# Patient Record
Sex: FEMALE | Race: WHITE | Employment: PART TIME | URBAN - METROPOLITAN AREA
[De-identification: names, ages, dates, MRNs, and addresses within clinical notes are randomized per-mention and may not be internally consistent; named-entity substitution may affect disease eponyms.]

---

## 2018-11-26 ENCOUNTER — HOSPITAL ENCOUNTER (EMERGENCY)
Age: 48
Discharge: HOME | End: 2018-11-26
Attending: EMERGENCY MEDICINE
Payer: COMMERCIAL

## 2018-11-26 VITALS
SYSTOLIC BLOOD PRESSURE: 136 MMHG | DIASTOLIC BLOOD PRESSURE: 82 MMHG | TEMPERATURE: 97.7 F | HEIGHT: 60 IN | OXYGEN SATURATION: 99 % | BODY MASS INDEX: 23.56 KG/M2 | RESPIRATION RATE: 18 BRPM | WEIGHT: 120 LBS | HEART RATE: 87 BPM

## 2018-11-26 DIAGNOSIS — N20.0 KIDNEY STONE: Primary | ICD-10-CM

## 2018-11-26 DIAGNOSIS — R31.29 OTHER MICROSCOPIC HEMATURIA: ICD-10-CM

## 2018-11-26 LAB
BACTERIA: NORMAL /HPF
BILIRUBIN URINE: NEGATIVE
BLOOD, URINE: ABNORMAL
CLARITY: CLEAR
COLOR: YELLOW
EPITHELIAL CELLS, UA: NORMAL /HPF
GLUCOSE URINE: NEGATIVE MG/DL
KETONES, URINE: NEGATIVE MG/DL
LEUKOCYTE ESTERASE, URINE: NEGATIVE
NITRITE, URINE: NEGATIVE
PH UA: 6 (ref 5–9)
PROTEIN UA: NEGATIVE MG/DL
RBC UA: NORMAL /HPF (ref 0–2)
SPECIFIC GRAVITY UA: 1 (ref 1–1.03)
URINE REFLEX TO CULTURE: YES
UROBILINOGEN, URINE: 0.2 E.U./DL
WBC UA: NORMAL /HPF (ref 0–5)

## 2018-11-26 PROCEDURE — 81001 URINALYSIS AUTO W/SCOPE: CPT

## 2018-11-26 PROCEDURE — 99283 EMERGENCY DEPT VISIT LOW MDM: CPT

## 2018-11-26 PROCEDURE — 87086 URINE CULTURE/COLONY COUNT: CPT

## 2018-11-26 ASSESSMENT — PAIN SCALES - GENERAL: PAINLEVEL_OUTOF10: 2

## 2018-11-26 ASSESSMENT — ENCOUNTER SYMPTOMS
PHOTOPHOBIA: 0
NAUSEA: 0
COLOR CHANGE: 0
FACIAL SWELLING: 0
RHINORRHEA: 0
ABDOMINAL PAIN: 0
EYE DISCHARGE: 0
ABDOMINAL DISTENTION: 0
VOMITING: 0
WHEEZING: 0
SHORTNESS OF BREATH: 0

## 2018-11-26 ASSESSMENT — PAIN DESCRIPTION - DESCRIPTORS: DESCRIPTORS: DISCOMFORT

## 2018-11-26 ASSESSMENT — PAIN DESCRIPTION - PAIN TYPE: TYPE: ACUTE PAIN

## 2018-11-26 ASSESSMENT — PAIN DESCRIPTION - ORIENTATION: ORIENTATION: RIGHT

## 2018-11-26 ASSESSMENT — PAIN DESCRIPTION - LOCATION: LOCATION: FLANK

## 2018-11-26 NOTE — ED PROVIDER NOTES
[facility] ED  eMERGENCY dEPARTMENT eNCOUnter      Pt Name: David Rosa  MRN: [id number]  [place] 1970  Date of evaluation: 11/26/2018  Provider: Philip Bales, [address] [place] [address]       Chief Complaint   Patient presents with    Hematuria         HISTORY OF PRESENT ILLNESS   (Location/Symptom, Timing/Onset,Context/Setting, Quality, Duration, Modifying Factors, Severity)  Note limiting factors. David Rosa is a 57 y.o. female who presents to the emergency department With a chief complaint of blood in her urine. Patient states that she noticed a little bit of red tinge to her urine last night and then this morning it was bright red. Upon arrival to the emergency department and given a urine specimen and it has gone away completely. She describes an ache off and on in her right flank that has been mild over the last week. She cannot think of anything that would have caused it and it doesn't bother her to move in certain positions. She is not having it now. She has not been nauseated. She has no history of kidney stones other than found inadvertently on cystoscopy. She denies any lower abdominal pain and is due for her period but not for another week. \"This doesn't seem like my period to me\". No other urine symptoms or fevers or chills. HPI    NursingNotes were reviewed. REVIEW OF SYSTEMS    (2-9 systems for level 4, 10 or more for level 5)     Review of Systems   Constitutional: Negative for activity change and appetite change. HENT: Negative for congestion, facial swelling and rhinorrhea. Eyes: Negative for photophobia and discharge. Respiratory: Negative for shortness of breath and wheezing. Cardiovascular: Negative for chest pain. Gastrointestinal: Negative for abdominal distention, abdominal pain, nausea and vomiting. Endocrine: Negative for polydipsia and polyphagia. Genitourinary: Positive for flank pain and hematuria.  Negative for decreased urine

## 2018-11-26 NOTE — ED TRIAGE NOTES
Patient presents to the ER as a walk in with c/o hematuria. Patient states when she awoke this morning and went to the restroom 'i thought I was urinating straight blood'. Patient states she also believes that she is due for her menstrual cycle but is unsure at this time. Patient also reports mild discomfort to her right flank, no radiating pain. Patient denies any dysuria, or frequency with urination. Patient asked to obtain urine specimen upon walking to bed 7 from triage.

## 2018-11-27 LAB — URINE CULTURE, ROUTINE: NORMAL

## 2019-03-21 ENCOUNTER — HOSPITAL ENCOUNTER (OUTPATIENT)
Dept: GENERAL RADIOLOGY | Age: 49
Discharge: HOME | End: 2019-03-23
Payer: COMMERCIAL

## 2019-03-21 DIAGNOSIS — R10.84 GENERALIZED ABDOMINAL PAIN: ICD-10-CM

## 2019-03-21 DIAGNOSIS — R31.9 HEMATURIA, UNSPECIFIED TYPE: ICD-10-CM

## 2019-03-21 PROCEDURE — 74018 RADEX ABDOMEN 1 VIEW: CPT

## 2019-04-10 ENCOUNTER — HOSPITAL ENCOUNTER (OUTPATIENT)
Dept: ULTRASOUND IMAGING | Age: 49
Discharge: HOME | End: 2019-04-12
Payer: COMMERCIAL

## 2019-04-10 ENCOUNTER — HOSPITAL ENCOUNTER (OUTPATIENT)
Dept: WOMENS IMAGING | Age: 49
Discharge: HOME | End: 2019-04-12
Payer: COMMERCIAL

## 2019-04-10 DIAGNOSIS — R31.9 HEMATURIA SYNDROME: ICD-10-CM

## 2019-04-10 DIAGNOSIS — Z12.31 ENCOUNTER FOR SCREENING MAMMOGRAM FOR BREAST CANCER: ICD-10-CM

## 2019-04-10 DIAGNOSIS — R10.9 ACUTE ABDOMINAL PAIN: ICD-10-CM

## 2019-04-10 PROCEDURE — 77067 SCR MAMMO BI INCL CAD: CPT

## 2019-04-10 PROCEDURE — 76830 TRANSVAGINAL US NON-OB: CPT

## 2019-04-10 PROCEDURE — 76856 US EXAM PELVIC COMPLETE: CPT

## 2019-04-17 ENCOUNTER — APPOINTMENT (OUTPATIENT)
Dept: ULTRASOUND IMAGING | Age: 49
End: 2019-04-17
Payer: COMMERCIAL

## 2019-04-17 ENCOUNTER — HOSPITAL ENCOUNTER (OUTPATIENT)
Dept: WOMENS IMAGING | Age: 49
Discharge: HOME | End: 2019-04-19
Payer: COMMERCIAL

## 2019-04-17 DIAGNOSIS — R92.8 ABNORMAL MAMMOGRAM: ICD-10-CM

## 2019-04-17 PROCEDURE — 77065 DX MAMMO INCL CAD UNI: CPT

## 2019-04-17 NOTE — PROGRESS NOTES
Subjective:      Patient ID: Nany Singleton is a 57 y.o. female. HPI  She is here today to review the films and reports of recent mammograms. She has no complaints about her breasts. History reviewed. No pertinent past medical history. Past Surgical History:   Procedure Laterality Date     SECTION       Current Outpatient Medications on File Prior to Visit   Medication Sig Dispense Refill    Coenzyme Q10 (CO Q 10 PO) Take by mouth      CORAL CALCIUM PO Take by mouth      Biotin w/ Vitamins C & E (HAIR/SKIN/NAILS PO) Take by mouth       No current facility-administered medications on file prior to visit. No Known Allergies  History reviewed. No pertinent family history. Social History     Tobacco Use    Smoking status: Former Smoker    Smokeless tobacco: Never Used   Substance Use Topics    Alcohol use: No    Drug use: No     Review of Systems  She has no other complaints. She has a physical job at [employer]. She lives with her . She had her first period at age 18. She is on her period now. Her periods are shorter with clots but come regularly. She is a  with her first pregnancy at age 25. She never used OCs. She has never had any breast biopsy or surgery. There is a family history of breast cancer in a great grandmother. There is no family history of ovarian cancer. Objective:   Physical Exam She appears well. Breath sounds are clear. The cardiac exam is fine. There is no murmur. The contour of the breasts is normal. Nipples are symmetric. There is no dimpling, bulging or skin edema noted. There are no breast or axillary masses noted bilaterally. I reviewed the films and reports of bilateral screening mammograms done 4/10/2019. An asymmetric density with calcifications is noted in the UOQ of the right breast.     I reviewed the films and reports of diagnostic right mammograms done 2019. The mammograms confirm the presence of fine calcifications.  A stereo biopsy was recommended. Assessment:      Abnormal right breast mammogram with calcifications      Plan:      Biopsy was recommended. Stereo biopsy was discussed and she is agreeable to proceed. She has requested that the biopsy be done 4/25/2019.          Dina Christianson MD

## 2019-04-19 ENCOUNTER — OFFICE VISIT (OUTPATIENT)
Dept: SURGERY | Age: 49
End: 2019-04-19
Payer: COMMERCIAL

## 2019-04-19 VITALS
HEIGHT: 60 IN | BODY MASS INDEX: 24.15 KG/M2 | WEIGHT: 123 LBS | SYSTOLIC BLOOD PRESSURE: 130 MMHG | DIASTOLIC BLOOD PRESSURE: 86 MMHG | TEMPERATURE: 98.9 F

## 2019-04-19 DIAGNOSIS — R92.0 ABNORMAL MAMMOGRAM WITH MICROCALCIFICATION: Primary | ICD-10-CM

## 2019-04-19 PROCEDURE — 99202 OFFICE O/P NEW SF 15 MIN: CPT | Performed by: SURGERY

## 2019-04-24 ENCOUNTER — PREP FOR PROCEDURE (OUTPATIENT)
Dept: WOMENS IMAGING | Age: 49
End: 2019-04-24

## 2019-04-24 RX ORDER — LIDOCAINE HYDROCHLORIDE 20 MG/ML
20 INJECTION, SOLUTION INFILTRATION; PERINEURAL ONCE
Status: CANCELLED | OUTPATIENT
Start: 2019-04-24 | End: 2019-04-24

## 2019-04-24 RX ORDER — SODIUM CHLORIDE 9 MG/ML
INJECTION, SOLUTION INTRAVENOUS CONTINUOUS
Status: CANCELLED | OUTPATIENT
Start: 2019-04-24

## 2019-04-25 ENCOUNTER — HOSPITAL ENCOUNTER (OUTPATIENT)
Dept: WOMENS IMAGING | Age: 49
Discharge: HOME | End: 2019-04-27
Payer: COMMERCIAL

## 2019-04-25 VITALS — SYSTOLIC BLOOD PRESSURE: 107 MMHG | DIASTOLIC BLOOD PRESSURE: 68 MMHG | RESPIRATION RATE: 20 BRPM | HEART RATE: 95 BPM

## 2019-04-25 DIAGNOSIS — R92.0 ABNORMAL MAMMOGRAM WITH MICROCALCIFICATION: ICD-10-CM

## 2019-04-25 PROCEDURE — 88305 TISSUE EXAM BY PATHOLOGIST: CPT

## 2019-04-25 PROCEDURE — 2580000003 HC RX 258: Performed by: SURGERY

## 2019-04-25 PROCEDURE — 19081 BX BREAST 1ST LESION STRTCTC: CPT | Performed by: SURGERY

## 2019-04-25 PROCEDURE — 2500000003 HC RX 250 WO HCPCS: Performed by: SURGERY

## 2019-04-25 PROCEDURE — 2709999900 MAM STEREO BREAST BX W LOC DEVICE 1ST LESION RIGHT

## 2019-04-25 RX ORDER — SODIUM CHLORIDE 9 MG/ML
INJECTION, SOLUTION INTRAVENOUS CONTINUOUS
Status: DISCONTINUED | OUTPATIENT
Start: 2019-04-25 | End: 2019-04-28 | Stop reason: HOSPADM

## 2019-04-25 RX ORDER — LIDOCAINE HYDROCHLORIDE 20 MG/ML
20 INJECTION, SOLUTION INFILTRATION; PERINEURAL ONCE
Status: COMPLETED | OUTPATIENT
Start: 2019-04-25 | End: 2019-04-25

## 2019-04-25 RX ADMIN — LIDOCAINE HYDROCHLORIDE 20 ML: 20 INJECTION, SOLUTION INFILTRATION; PERINEURAL at 12:59

## 2019-04-25 RX ADMIN — SODIUM CHLORIDE 250 ML: 9 INJECTION, SOLUTION INTRAVENOUS at 12:39

## 2019-04-25 ASSESSMENT — PAIN SCALES - GENERAL: PAINLEVEL_OUTOF10: 0

## 2019-04-25 NOTE — SEDATION DOCUMENTATION
Discharge instructions reviewed with patient and patient verbalizes understanding. Dressing clean, dry, and intact. Denies any pain. Patient left [facility] with steady gait.

## 2019-04-25 NOTE — SEDATION DOCUMENTATION
Area of concern located. Coordinates of mamm machine and computer checked. Dr. Rodríguez cleaned right breast site with chloraprep. She then injected lido 2% into site to numb area. Eviva needle guide NG12L used during procedure. Small incision made at site. Dr. Rodríguez then took samples of tissue using Eviva 1213-20 handiece lot [id number] exp 11-. Patient tolerated well. Film of specimen taken to check for cals and cals seen. Clip did not deploy. Compression held to site. Dressing of gauze and tegaderm applied.

## 2019-04-27 NOTE — OP NOTE
[address]                      [facility], [address] [facility]                                OPERATIVE REPORT    PATIENT NAME: Ruba Saldaña                     :        1970  MED REC NO:   [id number]                            ROOM:  ACCOUNT NO:   [de-identified]                           ADMIT DATE: 2019  PROVIDER:     Lisa Lynn MD    DATE OF PROCEDURE:  2019    PREOPERATIVE DIAGNOSIS:  Abnormal right breast mammogram with  indeterminate calcifications. POSTOPERATIVE DIAGNOSIS:  Abnormal right breast mammogram with  indeterminate calcifications. OPERATION PERFORMED:  Stereotactic biopsy of the right breast  (percutaneous automated vacuum-assisted biopsy). SURGEON:  Lisa Lynn MD    ANESTHESIA:  Local.    EBL:  Minimal.    CLINICAL NOTE:  This woman was referred because of a category four right  breast mammogram with indeterminate calcifications. Biopsy was  recommended. She presents today for stereotactic biopsy. OPERATIVE PROCEDURE:  The patient is brought to the [address] [place] [address] room. She is  seated. She underwent compression in a CC manner. Calcifications were  identified. Stereo views were obtained and the coordinate setup were  suitable for biopsy. The skin was prepped with chlorhexidine. She was  anesthetized with 2% lidocaine. The needle was advanced and fired. Supplemental local was given during the procedure for discomfort. Multiple core specimens were obtained. I attempted to deploy a clip at  the end of the procedure but because of the superficial needle  placement, I could not get the clip to advance and deploy. This was  abandoned. There was minimal bleeding controlled with pressure. She  was dressed with sterile gauze and an OpSite and left the suite in good  condition.         Orlean Carrel, MD    D: 2019 8:08:29       T: 2019 8:09:56     [email or web address]  Job#: [id number]     Doc#: [id number]    CC: